# Patient Record
Sex: FEMALE | Race: BLACK OR AFRICAN AMERICAN | NOT HISPANIC OR LATINO | Employment: FULL TIME | URBAN - METROPOLITAN AREA
[De-identification: names, ages, dates, MRNs, and addresses within clinical notes are randomized per-mention and may not be internally consistent; named-entity substitution may affect disease eponyms.]

---

## 2024-12-03 ENCOUNTER — OFFICE VISIT (OUTPATIENT)
Dept: GASTROENTEROLOGY | Facility: HOSPITAL | Age: 44
End: 2024-12-03
Payer: COMMERCIAL

## 2024-12-03 ENCOUNTER — HOSPITAL ENCOUNTER (OUTPATIENT)
Dept: RADIOLOGY | Facility: HOSPITAL | Age: 44
Discharge: HOME | End: 2024-12-03
Payer: COMMERCIAL

## 2024-12-03 VITALS
SYSTOLIC BLOOD PRESSURE: 128 MMHG | WEIGHT: 283 LBS | DIASTOLIC BLOOD PRESSURE: 90 MMHG | HEART RATE: 95 BPM | OXYGEN SATURATION: 96 % | TEMPERATURE: 97.1 F

## 2024-12-03 DIAGNOSIS — K58.1 IRRITABLE BOWEL SYNDROME WITH CONSTIPATION: ICD-10-CM

## 2024-12-03 DIAGNOSIS — K58.1 IRRITABLE BOWEL SYNDROME WITH CONSTIPATION: Primary | ICD-10-CM

## 2024-12-03 DIAGNOSIS — Z12.11 COLON CANCER SCREENING: ICD-10-CM

## 2024-12-03 PROCEDURE — 99214 OFFICE O/P EST MOD 30 MIN: CPT | Performed by: NURSE PRACTITIONER

## 2024-12-03 PROCEDURE — 74018 RADEX ABDOMEN 1 VIEW: CPT

## 2024-12-03 PROCEDURE — 1036F TOBACCO NON-USER: CPT | Performed by: NURSE PRACTITIONER

## 2024-12-03 PROCEDURE — 74018 RADEX ABDOMEN 1 VIEW: CPT | Performed by: RADIOLOGY

## 2024-12-03 RX ORDER — BLOOD-GLUCOSE,RECEIVER,CONT
EACH MISCELLANEOUS
COMMUNITY
Start: 2024-07-23

## 2024-12-03 RX ORDER — ARIPIPRAZOLE 10 MG/1
10 TABLET ORAL
COMMUNITY
Start: 2024-09-11

## 2024-12-03 RX ORDER — LAMOTRIGINE 25 MG/1
TABLET ORAL
COMMUNITY

## 2024-12-03 RX ORDER — INSULIN GLARGINE 100 [IU]/ML
55 INJECTION, SOLUTION SUBCUTANEOUS 2 TIMES DAILY
COMMUNITY
Start: 2023-02-17

## 2024-12-03 RX ORDER — TORSEMIDE 20 MG/1
TABLET ORAL
COMMUNITY
Start: 2023-07-03

## 2024-12-03 RX ORDER — LEVOTHYROXINE SODIUM 50 UG/1
50 TABLET ORAL
COMMUNITY
Start: 2024-05-14

## 2024-12-03 RX ORDER — ACETAMINOPHEN 500 MG
125 TABLET ORAL
COMMUNITY
Start: 2024-05-14

## 2024-12-03 RX ORDER — INSULIN GLARGINE 100 [IU]/ML
INJECTION, SOLUTION SUBCUTANEOUS
COMMUNITY
Start: 2024-10-14

## 2024-12-03 RX ORDER — CALCIUM CITRATE/VITAMIN D3 200MG-6.25
TABLET ORAL
COMMUNITY
Start: 2021-05-18

## 2024-12-03 RX ORDER — LISINOPRIL 10 MG/1
10 TABLET ORAL
COMMUNITY
Start: 2024-05-14

## 2024-12-03 RX ORDER — POLYETHYLENE GLYCOL 3350, SODIUM SULFATE ANHYDROUS, SODIUM BICARBONATE, SODIUM CHLORIDE, POTASSIUM CHLORIDE 236; 22.74; 6.74; 5.86; 2.97 G/4L; G/4L; G/4L; G/4L; G/4L
4 POWDER, FOR SOLUTION ORAL ONCE
Qty: 4000 ML | Refills: 0 | Status: SHIPPED | OUTPATIENT
Start: 2024-12-03 | End: 2024-12-03

## 2024-12-03 RX ORDER — ATORVASTATIN CALCIUM 80 MG/1
1 TABLET, FILM COATED ORAL NIGHTLY
COMMUNITY
Start: 2024-05-14

## 2024-12-03 RX ORDER — INSULIN LISPRO 100 [IU]/ML
12 INJECTION, SOLUTION INTRAVENOUS; SUBCUTANEOUS
COMMUNITY
Start: 2021-10-13

## 2024-12-03 ASSESSMENT — PATIENT HEALTH QUESTIONNAIRE - PHQ9
2. FEELING DOWN, DEPRESSED OR HOPELESS: NOT AT ALL
1. LITTLE INTEREST OR PLEASURE IN DOING THINGS: NOT AT ALL
SUM OF ALL RESPONSES TO PHQ9 QUESTIONS 1 & 2: 0

## 2024-12-03 ASSESSMENT — PAIN SCALES - GENERAL: PAINLEVEL_OUTOF10: 0-NO PAIN

## 2024-12-03 ASSESSMENT — LIFESTYLE VARIABLES
HOW OFTEN DO YOU HAVE A DRINK CONTAINING ALCOHOL: NEVER
HOW OFTEN DO YOU HAVE SIX OR MORE DRINKS ON ONE OCCASION: NEVER
AUDIT-C TOTAL SCORE: 0
HOW MANY STANDARD DRINKS CONTAINING ALCOHOL DO YOU HAVE ON A TYPICAL DAY: PATIENT DOES NOT DRINK
SKIP TO QUESTIONS 9-10: 1

## 2024-12-03 NOTE — PROGRESS NOTES
Subjective   Patient ID: Farida Camilo is a 43 y.o. female.    HPI  43-year-old female for follow-up visit for stomach pain  MHX; DM, hypothyroid, HTN  Arrived to her visit 45 minutes late for a same-day appointment  Previously seen 6/14/2022 for chronic idiopathic constipation  At that time I recommended MiraLAX  She had a left lower quadrant hernia and lipoma  No recent lab work done  6/17/2022 CT of the abdomen pelvis showed uterine fibroids, small umbilical hernia and no inguinal hernia  No diarrhea but always has to have Bm  Constantly in the bathroom having stools  Soft formed stools   Abd cramping  When she takes the miralax has more stool but doesn't feel like she is done  Fiber- apples 3 times a week  Water- 4 bottles per day  Has urge for BM  Feels incomplete  No straining  Had BM's all night and this am, still feels like she has stool    Objective   Physical Exam  Cardiovascular:      Rate and Rhythm: Normal rate and regular rhythm.      Pulses: Normal pulses.      Heart sounds: Normal heart sounds.   Pulmonary:      Effort: Pulmonary effort is normal.      Breath sounds: Normal breath sounds.   Abdominal:      General: Bowel sounds are normal.      Palpations: Abdomen is soft.         Assessment/Plan     Screening colonoscopy you are due for this exam and I will order this for you today.  Please follow the prep instructions that we discussed in clinic and will be provided to you in writing once you are scheduled as well.    Irritable bowel syndrome with constipation- I would recommend getting an abd xray  to assess for stool burden, add a fiber supplement such as benefiber one tablespoon daily to help you have meghna complete BM would also recommend a stool study for fecal elastace as well     If your abd x-ray show retained stool then I will recommend a clean out, if no significant amount of stool I would recommend a breath test for small intestinal bacterial overgrowth as well.    I will contact with  the results and determine follow up

## 2024-12-03 NOTE — PATIENT INSTRUCTIONS
Screening colonoscopy you are due for this exam and I will order this for you today.  Please follow the prep instructions that we discussed in clinic and will be provided to you in writing once you are scheduled as well.    Irritable bowel syndrome with constipation- I would recommend getting an abd xray  to assess for stool burden, add a fiber supplement such as benefiber one tablespoon daily to help you have meghna complete BM would also recommend a stool study for fecal elastace as well     If your abd x-ray show retained stool then I will recommend a clean out, if no significant amount of stool I would recommend a breath test for small intestinal bacterial overgrowth as well.    I will contact with the results and determine follow up

## 2024-12-05 DIAGNOSIS — K58.1 IRRITABLE BOWEL SYNDROME WITH CONSTIPATION: Primary | ICD-10-CM

## 2024-12-05 RX ORDER — POLYETHYLENE GLYCOL 3350 17 G/17G
17 POWDER, FOR SOLUTION ORAL DAILY
Qty: 510 G | Refills: 3 | Status: SHIPPED | OUTPATIENT
Start: 2024-12-05 | End: 2025-01-04

## 2025-02-12 DIAGNOSIS — K58.1 IRRITABLE BOWEL SYNDROME WITH CONSTIPATION: Primary | ICD-10-CM

## 2025-02-13 LAB — ELASTASE PANC STL-MCNT: 635 MCG/G

## 2025-03-03 ENCOUNTER — PROCEDURE VISIT (OUTPATIENT)
Dept: GASTROENTEROLOGY | Facility: CLINIC | Age: 45
End: 2025-03-03
Payer: COMMERCIAL

## 2025-03-03 DIAGNOSIS — K58.1 IRRITABLE BOWEL SYNDROME WITH CONSTIPATION: ICD-10-CM

## 2025-03-03 PROCEDURE — 91065 BREATH HYDROGEN/METHANE TEST: CPT | Performed by: NURSE PRACTITIONER

## 2025-03-03 PROCEDURE — 91065 BREATH HYDROGEN/METHANE TEST: CPT

## 2025-03-03 NOTE — PROGRESS NOTES
Patient ID: Farida Camilo is a 44 y.o. female.    Breath Hydrogen Test-Dextrose    Date/Time: 3/3/2025 10:56 AM    Performed by: Cata Mcdaniel RN  Authorized by: JULIO Calvin    Consent:     Consent obtained:  Verbal  Universal protocol:     Patient identity confirmed:  Verbally with patient  Post-procedure details:     Procedure completion:  Tolerated  Hydrogen Breath Analysis Consultation Sheet    Referring Provider: JULIO Calvin  [address]  [facility] [address]    Indication: IBS with constipation    Age: 44 y.o.  Weight: There were no vitals filed for this visit.  Substrate: 75 GRAMS DEXTROSE    Last Meal: 1600-BUT CHEWING GUM WHEN STARTED TEST  Recent Antibiotics: Denies    RESULTS:   Time PPM (H2) APPM* (CH4) CO2 Correction   Baseline #1 0850 6 3 4.4 1.25   Baseline #2 0855 6 2 4.7 1.17   *Challenge Dose Sugar: 0900  15' 0915 6 3 4.6 1.20   30' 0930 8 4 4.7 1.17   45' 0945 6 3 4.3 1.27   60' 1000 6 2 4.4 1.24   75' 1015 4 1 4.2 1.30   90' 1030 4 1 4.1 1.27   105' 1045 4 1 4.0 1.37   120'        135'        150'        165'        180'          Impression: Negative for SIBO

## 2025-06-03 ENCOUNTER — APPOINTMENT (OUTPATIENT)
Dept: GASTROENTEROLOGY | Facility: HOSPITAL | Age: 45
End: 2025-06-03
Payer: COMMERCIAL